# Patient Record
Sex: MALE | Race: WHITE | NOT HISPANIC OR LATINO | Employment: UNEMPLOYED | URBAN - METROPOLITAN AREA
[De-identification: names, ages, dates, MRNs, and addresses within clinical notes are randomized per-mention and may not be internally consistent; named-entity substitution may affect disease eponyms.]

---

## 2023-04-10 ENCOUNTER — TELEPHONE (OUTPATIENT)
Dept: UROLOGY | Facility: CLINIC | Age: 38
End: 2023-04-10

## 2023-04-10 NOTE — TELEPHONE ENCOUNTER
----- Message from Adia Jean sent at 4/10/2023  9:51 AM CDT -----  Type: Needs Medical Advice  Who Called:  Pt  Best Call Back Number: [phone]  Additional Information: Pt would like to get more info about a vasectomy please call back to advise Thanks

## 2023-05-17 ENCOUNTER — OFFICE VISIT (OUTPATIENT)
Dept: UROLOGY | Facility: CLINIC | Age: 38
End: 2023-05-17

## 2023-05-17 VITALS — WEIGHT: 163.69 LBS | BODY MASS INDEX: 19.93 KG/M2 | HEIGHT: 76 IN

## 2023-05-17 DIAGNOSIS — Z30.09 VASECTOMY EVALUATION: Primary | ICD-10-CM

## 2023-05-17 DIAGNOSIS — Z30.2 ENCOUNTER FOR STERILIZATION: ICD-10-CM

## 2023-05-17 PROCEDURE — 99999 PR PBB SHADOW E&M-EST. PATIENT-LVL II: ICD-10-PCS | Mod: PBBFAC,,,

## 2023-05-17 PROCEDURE — 99203 PR OFFICE/OUTPT VISIT, NEW, LEVL III, 30-44 MIN: ICD-10-PCS | Mod: S$PBB,,,

## 2023-05-17 PROCEDURE — 99212 OFFICE O/P EST SF 10 MIN: CPT | Mod: PBBFAC,PO

## 2023-05-17 PROCEDURE — 99203 OFFICE O/P NEW LOW 30 MIN: CPT | Mod: S$PBB,,,

## 2023-05-17 PROCEDURE — 99999 PR PBB SHADOW E&M-EST. PATIENT-LVL II: CPT | Mod: PBBFAC,,,

## 2023-05-17 RX ORDER — DIAZEPAM 10 MG/1
10 TABLET ORAL ONCE
Qty: 1 TABLET | Refills: 0 | Status: SHIPPED | OUTPATIENT
Start: 2023-05-17 | End: 2023-11-02

## 2023-05-17 NOTE — PROGRESS NOTES
[facility] Note  Staff: IDALIA Capps    PCP: None    Chief Complaint: Vasectomy Consult    Subjective:        HPI: Saw Kellogg is a 37 y.o. male NEW PATIENT presents today for vasectomy evaluation. He desires permanent sterility. He states he and his wife have 1 child together with 1 on the way. He states they are both in agreement of no more children. He understands that a vasectomy will result in permanent sterility. He states his wife is currently pregnant and is due in August. He is healthy and has no urinary complaints. The procedure was explained in detail. All risks including bleeding, infection, hematoma, and failure were discussed. He understands that he will not be immediately sterile and that alternative birth control should be used until azospermia can be confirmed microscopically. Post procedural pain and limitations were discussed. He states that he is a  and will have no problems returning to work the following week. He was given written instructions and all questions answered.    Questions asked the pt during ov today:  Urgency: No, urge incontinence? No  Dysuria: No  Gross Hematuria:No  Straining:No, Hesistancy:No, Intermittency:No}, Weak stream:No    Last PSA Screening: No results found for: PSA, PSADIAG    History of Kidney Stones?:  No    Constipation issues?:  No    REVIEW OF SYSTEMS:  Review of Systems   Constitutional: Negative.  Negative for chills and fever.   HENT: Negative.     Eyes: Negative.    Respiratory: Negative.     Cardiovascular: Negative.    Gastrointestinal: Negative.  Negative for abdominal pain, constipation, diarrhea, nausea and vomiting.   Genitourinary: Negative.  Negative for dysuria, flank pain, frequency, hematuria and urgency.   Musculoskeletal: Negative.  Negative for back pain.   Skin: Negative.    Neurological: Negative.    Endo/Heme/Allergies: Negative.    Psychiatric/Behavioral: Negative.       PMHx:  No past medical  history on file.    PSHx:  No past surgical history on file.    Fam Hx:   malignancies: No    kidney stones: No     Soc Hx:  Lives in [place]    Allergies:  Patient has no allergy information on record.    Medications: reviewed     Objective:   There were no vitals filed for this visit.    Physical Exam  Constitutional:       Appearance: Normal appearance.   HENT:      Head: Normocephalic.      Mouth/Throat:      Mouth: Mucous membranes are moist.   Eyes:      Conjunctiva/sclera: Conjunctivae normal.   Pulmonary:      Effort: Pulmonary effort is normal.   Abdominal:      General: There is no distension.      Palpations: Abdomen is soft.      Tenderness: There is no abdominal tenderness. There is no right CVA tenderness or left CVA tenderness.   Musculoskeletal:         General: Normal range of motion.      Cervical back: Normal range of motion.   Skin:     General: Skin is warm.   Neurological:      Mental Status: He is alert and oriented to person, place, and time.   Psychiatric:         Mood and Affect: Mood normal.         Behavior: Behavior normal.        EXAM performed by me in office today:  lucero vas palpated  No scrotal rashes, cysts or lesions  Epididymis normal in size, no tenderness  Testes normal and size, equal size bilaterally, no masses  Urethral meatus normal without discharge  Penis is circumcised    Assessment:       1. Vasectomy evaluation    2. Encounter for sterilization          Plan:     Pt scheduled for in office vasectomy at earliest convenience  Written instructions given to patient for pre and post procedural care, pt verbalized understanding    F/u As Needed per Treatment Plan    [facility]: IDALIA Lino

## 2023-06-27 ENCOUNTER — TELEPHONE (OUTPATIENT)
Dept: UROLOGY | Facility: CLINIC | Age: 38
End: 2023-06-27

## 2023-06-27 NOTE — TELEPHONE ENCOUNTER
----- Message from Ros Carl sent at 6/27/2023 10:14 AM CDT -----  Contact: pt  Type:  Sooner Appointment Request    Caller is requesting a sooner appointment.  Caller declined first available appointment listed below.  Caller will not accept being placed on the waitlist and is requesting a message be sent to doctor.    Name of Caller:  pt   When is the first available appointment?  08/17  Symptoms:  vasectomy   Best Call Back Number:  [phone]    Additional Information:  pt would like a sooner appt if possible. Please advise.

## 2023-08-17 ENCOUNTER — TELEPHONE (OUTPATIENT)
Dept: UROLOGY | Facility: CLINIC | Age: 38
End: 2023-08-17

## 2023-08-17 NOTE — TELEPHONE ENCOUNTER
Spoke with patient ,he states his wife is having a baby and he needs to cancel his Vasectomy for today he will call back to reschedule at a later time.

## 2023-08-17 NOTE — TELEPHONE ENCOUNTER
----- Message from Colton Raman sent at 8/17/2023  9:30 AM CDT -----  Contact: pt at  [phone]  Type: Needs Medical Advice  Who Called:  pt  Best Call Back Number: [phone]  Additional Information: pt is calling the office requesting a call back from the nurse.    PER THE PT PLEASE CALL BACK TODAY ASAP

## 2023-11-02 ENCOUNTER — OFFICE VISIT (OUTPATIENT)
Dept: UROLOGY | Facility: CLINIC | Age: 38
End: 2023-11-02

## 2023-11-02 VITALS — HEIGHT: 76 IN | BODY MASS INDEX: 19.95 KG/M2 | WEIGHT: 163.81 LBS

## 2023-11-02 DIAGNOSIS — Z30.2 ENCOUNTER FOR STERILIZATION: ICD-10-CM

## 2023-11-02 PROCEDURE — 55250 PR REMOVAL OF SPERM DUCT(S): ICD-10-PCS | Mod: ,,, | Performed by: UROLOGY

## 2023-11-02 PROCEDURE — 55250 REMOVAL OF SPERM DUCT(S): CPT | Mod: PBBFAC,PO | Performed by: UROLOGY

## 2023-11-02 PROCEDURE — 99499 UNLISTED E&M SERVICE: CPT | Mod: S$PBB,,, | Performed by: UROLOGY

## 2023-11-02 PROCEDURE — 99999 PR PBB SHADOW E&M-EST. PATIENT-LVL II: CPT | Mod: PBBFAC,,, | Performed by: UROLOGY

## 2023-11-02 PROCEDURE — 99212 OFFICE O/P EST SF 10 MIN: CPT | Mod: PBBFAC,PO | Performed by: UROLOGY

## 2023-11-02 PROCEDURE — 55250 REMOVAL OF SPERM DUCT(S): CPT | Mod: ,,, | Performed by: UROLOGY

## 2023-11-02 PROCEDURE — 99999 PR PBB SHADOW E&M-EST. PATIENT-LVL II: ICD-10-PCS | Mod: PBBFAC,,, | Performed by: UROLOGY

## 2023-11-02 PROCEDURE — 99499 NO LOS: ICD-10-PCS | Mod: S$PBB,,, | Performed by: UROLOGY

## 2023-11-02 RX ORDER — HYDROCODONE BITARTRATE AND ACETAMINOPHEN 5; 325 MG/1; MG/1
1 TABLET ORAL EVERY 6 HOURS PRN
Qty: 10 TABLET | Refills: 0 | Status: SHIPPED | OUTPATIENT
Start: 2023-11-02

## 2023-11-02 RX ORDER — DIAZEPAM 10 MG/1
10 TABLET ORAL ONCE
Qty: 1 TABLET | Refills: 0 | Status: SHIPPED | OUTPATIENT
Start: 2023-11-02 | End: 2023-11-02

## 2023-11-02 NOTE — PROGRESS NOTES
Procedure:  Bilateral Vasectomy    Diagnosis:  Desired sterility    Anesthesia:  Local and oral sedation with Valium    Blood loss:  None    Complications:  None    Specimen:  None    Indications:  Mr Kellogg has 2 kids and he desires permanent sterility.  He was previously evaluated and I discussed the procedure and risks and answered all question.  He is scheduled for vasectomy today and gives his written consent.    Procedure in detail:  After informed consent was obtained,  The genitals were prepped and draped in a sterile fashion.  Starting on the left side, the vas was palpated.  5 milliliter(s) on lidocaine was injected under the skin and around in in the vas sheath.  A small incision was made over the vas with an #11 blade.  The subcutaneous tissue was divided with a hemostat.  I grasped the vas with a vas looped forceps and externalized the vas.  The overlying sheath was divided with electrocautery.  A section of the vas was isolated and then completely excised.  The needle tip of the cautery was then placed in the proximal and distal vasa lumen and fulgurated.  Each end of the cut vas was buried in the sheath with a 3-0 chromic suture.  With gentle traction on the testes the vas and sheath were brought back into its normal anatomic position.  The skin was then closed with a single chromic suture.  The identical procedure was then performed on the right side.  The vas was isolated, another 5 milliliter(s) of lidocaine was injected,  an incision was made in the skin and the vas was externalized.  The sheath was incised and a section of vas measuring approximately 2 cm in length was excised.  The lumens were fulgurated and buried individually and the vas sheath.  The skin was then closed with a single chromic suture.  He tolerated the procedure well and there were no conditions.

## 2023-11-07 ENCOUNTER — TELEPHONE (OUTPATIENT)
Dept: UROLOGY | Facility: CLINIC | Age: 38
End: 2023-11-07

## 2023-12-11 ENCOUNTER — TELEPHONE (OUTPATIENT)
Dept: UROLOGY | Facility: CLINIC | Age: 38
End: 2023-12-11

## 2023-12-11 ENCOUNTER — CLINICAL SUPPORT (OUTPATIENT)
Dept: UROLOGY | Facility: CLINIC | Age: 38
End: 2023-12-11

## 2023-12-11 DIAGNOSIS — Z30.09 VASECTOMY EVALUATION: Primary | ICD-10-CM

## 2023-12-11 PROCEDURE — 99212 OFFICE O/P EST SF 10 MIN: CPT | Mod: PBBFAC,PO

## 2023-12-11 PROCEDURE — 99999 PR PBB SHADOW E&M-EST. PATIENT-LVL II: ICD-10-PCS | Mod: PBBFAC,,,

## 2023-12-11 PROCEDURE — 99999 PR PBB SHADOW E&M-EST. PATIENT-LVL II: CPT | Mod: PBBFAC,,,

## 2023-12-11 NOTE — PROGRESS NOTES
Patient to clinic for post vasectomy specimen drop off.  reviewed under microscope and concluded it was negative for any living sperm

## 2023-12-11 NOTE — TELEPHONE ENCOUNTER
----- Message from May Cheek MA sent at 12/11/2023  8:45 AM CST -----  Type:  Patient Returning Call    Who Called:  pt   Who Left Message for Patient:  nurse   Does the patient know what this is regarding?:  yes   Best Call Back Number:  [phone]    Additional Information:  please advise

## 2023-12-11 NOTE — TELEPHONE ENCOUNTER
----- Message from Blake Greenwood sent at 12/11/2023  8:06 AM CST -----  Contact: self  Type: Sooner Appointment Request        Caller is requesting a sooner appointment. Caller declined first available appointment listed below. Caller will not accept being placed on the waitlist and is requesting a message be sent to doctor.        Name of Caller: Patient   Best Call Back Number: [phone]  Additional Information: Pt states he had a vasectomy in November was told he needed to drop off a semen specimen in exactly 6 weeks which is today wants to know can he drop to off later.  Plz call to let pt know when he can come. Thanks

## 2024-01-16 ENCOUNTER — TELEPHONE (OUTPATIENT)
Dept: UROLOGY | Facility: CLINIC | Age: 39
End: 2024-01-16

## 2024-01-16 NOTE — TELEPHONE ENCOUNTER
----- Message from Cheli Chavez sent at 1/16/2024 12:54 PM CST -----  Regarding: specimen  Contact: pt  Type:  Needs Medical Advice    Who Called: pt  Would the patient rather a call back or a response via [facility]? Call back  Best Call Back Number: [phone]    Additional Information: sts he would like to drop off a specimen tomorrow and wants to know if it's OK--please advise

## 2024-01-17 ENCOUNTER — CLINICAL SUPPORT (OUTPATIENT)
Dept: UROLOGY | Facility: CLINIC | Age: 39
End: 2024-01-17

## 2024-01-17 DIAGNOSIS — Z30.09 VASECTOMY EVALUATION: Primary | ICD-10-CM

## 2024-01-17 NOTE — PROGRESS NOTES
Patient to clinic to drop off 10 week post vasectomy semen specimen.  reviewed under microscope and concluded it was negative for any living sperm. Patient contacted with results.